# Patient Record
Sex: FEMALE | Race: WHITE | NOT HISPANIC OR LATINO | Employment: OTHER | URBAN - NONMETROPOLITAN AREA
[De-identification: names, ages, dates, MRNs, and addresses within clinical notes are randomized per-mention and may not be internally consistent; named-entity substitution may affect disease eponyms.]

---

## 2022-08-12 PROCEDURE — 99285 EMERGENCY DEPT VISIT HI MDM: CPT

## 2022-08-13 ENCOUNTER — APPOINTMENT (EMERGENCY)
Dept: CT IMAGING | Facility: HOSPITAL | Age: 67
End: 2022-08-13
Payer: COMMERCIAL

## 2022-08-13 ENCOUNTER — HOSPITAL ENCOUNTER (OUTPATIENT)
Facility: HOSPITAL | Age: 67
Setting detail: OBSERVATION
Discharge: NURSING, REHAB OR LONG-TERM CARE | End: 2022-08-13
Attending: EMERGENCY MEDICINE | Admitting: FAMILY MEDICINE
Payer: COMMERCIAL

## 2022-08-13 VITALS
WEIGHT: 293 LBS | TEMPERATURE: 98 F | SYSTOLIC BLOOD PRESSURE: 115 MMHG | RESPIRATION RATE: 16 BRPM | HEIGHT: 66 IN | BODY MASS INDEX: 47.09 KG/M2 | DIASTOLIC BLOOD PRESSURE: 55 MMHG | OXYGEN SATURATION: 90 % | HEART RATE: 77 BPM

## 2022-08-13 DIAGNOSIS — N28.89 RENAL MASS: ICD-10-CM

## 2022-08-13 DIAGNOSIS — R25.1 TREMOR: ICD-10-CM

## 2022-08-13 DIAGNOSIS — N18.9 CHRONIC KIDNEY DISEASE: ICD-10-CM

## 2022-08-13 DIAGNOSIS — N17.9 ACUTE KIDNEY INJURY (HCC): ICD-10-CM

## 2022-08-13 DIAGNOSIS — E87.20 LACTIC ACIDOSIS: ICD-10-CM

## 2022-08-13 DIAGNOSIS — E87.6 HYPOKALEMIA: Primary | ICD-10-CM

## 2022-08-13 LAB
ALBUMIN SERPL BCP-MCNC: 3.3 G/DL (ref 3.5–5)
ALP SERPL-CCNC: 68 U/L (ref 46–116)
ALT SERPL W P-5'-P-CCNC: 38 U/L (ref 12–78)
ANION GAP SERPL CALCULATED.3IONS-SCNC: 5 MMOL/L (ref 4–13)
ANION GAP SERPL CALCULATED.3IONS-SCNC: 5 MMOL/L (ref 4–13)
ANION GAP SERPL CALCULATED.3IONS-SCNC: 7 MMOL/L (ref 4–13)
APTT PPP: 30 SECONDS (ref 23–37)
AST SERPL W P-5'-P-CCNC: 31 U/L (ref 5–45)
BASOPHILS # BLD AUTO: 0.02 THOUSANDS/ΜL (ref 0–0.1)
BASOPHILS NFR BLD AUTO: 0 % (ref 0–1)
BILIRUB SERPL-MCNC: 0.91 MG/DL (ref 0.2–1)
BILIRUB UR QL STRIP: NEGATIVE
BUN SERPL-MCNC: 68 MG/DL (ref 5–25)
BUN SERPL-MCNC: 73 MG/DL (ref 5–25)
BUN SERPL-MCNC: 75 MG/DL (ref 5–25)
CALCIUM ALBUM COR SERPL-MCNC: 10.7 MG/DL (ref 8.3–10.1)
CALCIUM SERPL-MCNC: 10.1 MG/DL (ref 8.3–10.1)
CALCIUM SERPL-MCNC: 9.5 MG/DL (ref 8.3–10.1)
CALCIUM SERPL-MCNC: 9.5 MG/DL (ref 8.3–10.1)
CHLORIDE SERPL-SCNC: 100 MMOL/L (ref 96–108)
CHLORIDE SERPL-SCNC: 96 MMOL/L (ref 96–108)
CHLORIDE SERPL-SCNC: 99 MMOL/L (ref 96–108)
CK MB SERPL-MCNC: 1 % (ref 0–2.5)
CK MB SERPL-MCNC: 1.6 NG/ML (ref 0–5)
CK SERPL-CCNC: 162 U/L (ref 26–192)
CLARITY UR: CLEAR
CO2 SERPL-SCNC: 35 MMOL/L (ref 21–32)
CO2 SERPL-SCNC: 38 MMOL/L (ref 21–32)
CO2 SERPL-SCNC: 38 MMOL/L (ref 21–32)
COLOR UR: YELLOW
CREAT SERPL-MCNC: 2.37 MG/DL (ref 0.6–1.3)
CREAT SERPL-MCNC: 2.85 MG/DL (ref 0.6–1.3)
CREAT SERPL-MCNC: 3.02 MG/DL (ref 0.6–1.3)
EOSINOPHIL # BLD AUTO: 0.27 THOUSAND/ΜL (ref 0–0.61)
EOSINOPHIL NFR BLD AUTO: 5 % (ref 0–6)
ERYTHROCYTE [DISTWIDTH] IN BLOOD BY AUTOMATED COUNT: 21.4 % (ref 11.6–15.1)
FLUAV RNA RESP QL NAA+PROBE: NEGATIVE
FLUBV RNA RESP QL NAA+PROBE: NEGATIVE
GFR SERPL CREATININE-BSD FRML MDRD: 15 ML/MIN/1.73SQ M
GFR SERPL CREATININE-BSD FRML MDRD: 16 ML/MIN/1.73SQ M
GFR SERPL CREATININE-BSD FRML MDRD: 20 ML/MIN/1.73SQ M
GLUCOSE SERPL-MCNC: 122 MG/DL (ref 65–140)
GLUCOSE SERPL-MCNC: 264 MG/DL (ref 65–140)
GLUCOSE SERPL-MCNC: 99 MG/DL (ref 65–140)
GLUCOSE UR STRIP-MCNC: NEGATIVE MG/DL
HCT VFR BLD AUTO: 40.5 % (ref 34.8–46.1)
HGB BLD-MCNC: 13 G/DL (ref 11.5–15.4)
HGB UR QL STRIP.AUTO: NEGATIVE
IMM GRANULOCYTES # BLD AUTO: 0.01 THOUSAND/UL (ref 0–0.2)
IMM GRANULOCYTES NFR BLD AUTO: 0 % (ref 0–2)
INR PPP: 1.54 (ref 0.84–1.19)
KETONES UR STRIP-MCNC: NEGATIVE MG/DL
LACTATE SERPL-SCNC: 1.3 MMOL/L (ref 0.5–2)
LACTATE SERPL-SCNC: 2.2 MMOL/L (ref 0.5–2)
LEUKOCYTE ESTERASE UR QL STRIP: NEGATIVE
LIPASE SERPL-CCNC: 185 U/L (ref 73–393)
LYMPHOCYTES # BLD AUTO: 1.67 THOUSANDS/ΜL (ref 0.6–4.47)
LYMPHOCYTES NFR BLD AUTO: 29 % (ref 14–44)
MAGNESIUM SERPL-MCNC: 1.9 MG/DL (ref 1.6–2.6)
MCH RBC QN AUTO: 27.5 PG (ref 26.8–34.3)
MCHC RBC AUTO-ENTMCNC: 32.1 G/DL (ref 31.4–37.4)
MCV RBC AUTO: 86 FL (ref 82–98)
MONOCYTES # BLD AUTO: 0.53 THOUSAND/ΜL (ref 0.17–1.22)
MONOCYTES NFR BLD AUTO: 9 % (ref 4–12)
NEUTROPHILS # BLD AUTO: 3.18 THOUSANDS/ΜL (ref 1.85–7.62)
NEUTS SEG NFR BLD AUTO: 57 % (ref 43–75)
NITRITE UR QL STRIP: NEGATIVE
NRBC BLD AUTO-RTO: 0 /100 WBCS
PH UR STRIP.AUTO: 7.5 [PH]
PLATELET # BLD AUTO: 218 THOUSANDS/UL (ref 149–390)
PMV BLD AUTO: 9.8 FL (ref 8.9–12.7)
POTASSIUM SERPL-SCNC: 2.5 MMOL/L (ref 3.5–5.3)
POTASSIUM SERPL-SCNC: 2.8 MMOL/L (ref 3.5–5.3)
POTASSIUM SERPL-SCNC: 4.3 MMOL/L (ref 3.5–5.3)
PROT SERPL-MCNC: 7.3 G/DL (ref 6.4–8.4)
PROT UR STRIP-MCNC: NEGATIVE MG/DL
PROTHROMBIN TIME: 18.6 SECONDS (ref 11.6–14.5)
RBC # BLD AUTO: 4.72 MILLION/UL (ref 3.81–5.12)
RSV RNA RESP QL NAA+PROBE: NEGATIVE
SARS-COV-2 RNA RESP QL NAA+PROBE: NEGATIVE
SODIUM SERPL-SCNC: 139 MMOL/L (ref 135–147)
SODIUM SERPL-SCNC: 141 MMOL/L (ref 135–147)
SODIUM SERPL-SCNC: 143 MMOL/L (ref 135–147)
SP GR UR STRIP.AUTO: 1.01 (ref 1–1.03)
UROBILINOGEN UR QL STRIP.AUTO: 0.2 E.U./DL
WBC # BLD AUTO: 5.68 THOUSAND/UL (ref 4.31–10.16)

## 2022-08-13 PROCEDURE — 93005 ELECTROCARDIOGRAM TRACING: CPT

## 2022-08-13 PROCEDURE — 99236 HOSP IP/OBS SAME DATE HI 85: CPT | Performed by: INTERNAL MEDICINE

## 2022-08-13 PROCEDURE — 80048 BASIC METABOLIC PNL TOTAL CA: CPT

## 2022-08-13 PROCEDURE — 36415 COLL VENOUS BLD VENIPUNCTURE: CPT | Performed by: EMERGENCY MEDICINE

## 2022-08-13 PROCEDURE — 85730 THROMBOPLASTIN TIME PARTIAL: CPT | Performed by: EMERGENCY MEDICINE

## 2022-08-13 PROCEDURE — 74176 CT ABD & PELVIS W/O CONTRAST: CPT

## 2022-08-13 PROCEDURE — 82550 ASSAY OF CK (CPK): CPT | Performed by: EMERGENCY MEDICINE

## 2022-08-13 PROCEDURE — 83735 ASSAY OF MAGNESIUM: CPT | Performed by: EMERGENCY MEDICINE

## 2022-08-13 PROCEDURE — 96361 HYDRATE IV INFUSION ADD-ON: CPT

## 2022-08-13 PROCEDURE — 99285 EMERGENCY DEPT VISIT HI MDM: CPT | Performed by: EMERGENCY MEDICINE

## 2022-08-13 PROCEDURE — 80053 COMPREHEN METABOLIC PANEL: CPT | Performed by: EMERGENCY MEDICINE

## 2022-08-13 PROCEDURE — 70450 CT HEAD/BRAIN W/O DYE: CPT

## 2022-08-13 PROCEDURE — 80048 BASIC METABOLIC PNL TOTAL CA: CPT | Performed by: EMERGENCY MEDICINE

## 2022-08-13 PROCEDURE — 0241U HB NFCT DS VIR RESP RNA 4 TRGT: CPT | Performed by: EMERGENCY MEDICINE

## 2022-08-13 PROCEDURE — 82553 CREATINE MB FRACTION: CPT | Performed by: EMERGENCY MEDICINE

## 2022-08-13 PROCEDURE — 96360 HYDRATION IV INFUSION INIT: CPT

## 2022-08-13 PROCEDURE — 85610 PROTHROMBIN TIME: CPT | Performed by: EMERGENCY MEDICINE

## 2022-08-13 PROCEDURE — 85025 COMPLETE CBC W/AUTO DIFF WBC: CPT | Performed by: EMERGENCY MEDICINE

## 2022-08-13 PROCEDURE — 83690 ASSAY OF LIPASE: CPT | Performed by: EMERGENCY MEDICINE

## 2022-08-13 PROCEDURE — 81003 URINALYSIS AUTO W/O SCOPE: CPT | Performed by: EMERGENCY MEDICINE

## 2022-08-13 PROCEDURE — 83605 ASSAY OF LACTIC ACID: CPT | Performed by: EMERGENCY MEDICINE

## 2022-08-13 RX ORDER — OXYCODONE HYDROCHLORIDE AND ACETAMINOPHEN 5; 325 MG/1; MG/1
2 TABLET ORAL EVERY 4 HOURS PRN
Status: DISCONTINUED | OUTPATIENT
Start: 2022-08-13 | End: 2022-08-13 | Stop reason: HOSPADM

## 2022-08-13 RX ORDER — POTASSIUM CHLORIDE 20 MEQ/1
40 TABLET, EXTENDED RELEASE ORAL ONCE
Status: COMPLETED | OUTPATIENT
Start: 2022-08-13 | End: 2022-08-13

## 2022-08-13 RX ORDER — AMIODARONE HYDROCHLORIDE 200 MG/1
200 TABLET ORAL DAILY
Status: DISCONTINUED | OUTPATIENT
Start: 2022-08-13 | End: 2022-08-13 | Stop reason: HOSPADM

## 2022-08-13 RX ORDER — INSULIN HUMAN 500 [IU]/ML
INJECTION, SOLUTION SUBCUTANEOUS
COMMUNITY

## 2022-08-13 RX ORDER — POTASSIUM CHLORIDE 20 MEQ/1
20 TABLET, EXTENDED RELEASE ORAL DAILY
COMMUNITY

## 2022-08-13 RX ORDER — ONDANSETRON 2 MG/ML
4 INJECTION INTRAMUSCULAR; INTRAVENOUS EVERY 6 HOURS PRN
Status: DISCONTINUED | OUTPATIENT
Start: 2022-08-13 | End: 2022-08-13 | Stop reason: HOSPADM

## 2022-08-13 RX ORDER — GABAPENTIN 300 MG/1
600 CAPSULE ORAL EVERY 8 HOURS SCHEDULED
Status: DISCONTINUED | OUTPATIENT
Start: 2022-08-13 | End: 2022-08-13 | Stop reason: HOSPADM

## 2022-08-13 RX ORDER — POTASSIUM CHLORIDE 14.9 MG/ML
20 INJECTION INTRAVENOUS
Status: COMPLETED | OUTPATIENT
Start: 2022-08-13 | End: 2022-08-13

## 2022-08-13 RX ORDER — ROPINIROLE 1 MG/1
5 TABLET, FILM COATED ORAL
Status: DISCONTINUED | OUTPATIENT
Start: 2022-08-13 | End: 2022-08-13 | Stop reason: HOSPADM

## 2022-08-13 RX ORDER — SODIUM CHLORIDE 9 MG/ML
50 INJECTION, SOLUTION INTRAVENOUS CONTINUOUS
Status: DISCONTINUED | OUTPATIENT
Start: 2022-08-13 | End: 2022-08-13 | Stop reason: HOSPADM

## 2022-08-13 RX ORDER — ERGOCALCIFEROL (VITAMIN D2) 1250 MCG
50000 CAPSULE ORAL WEEKLY
COMMUNITY

## 2022-08-13 RX ORDER — ATORVASTATIN CALCIUM 10 MG/1
10 TABLET, FILM COATED ORAL
Status: DISCONTINUED | OUTPATIENT
Start: 2022-08-13 | End: 2022-08-13 | Stop reason: HOSPADM

## 2022-08-13 RX ORDER — GABAPENTIN 600 MG/1
600 TABLET ORAL EVERY 8 HOURS
COMMUNITY

## 2022-08-13 RX ORDER — ACETAMINOPHEN 325 MG/1
650 TABLET ORAL ONCE
Status: COMPLETED | OUTPATIENT
Start: 2022-08-13 | End: 2022-08-13

## 2022-08-13 RX ORDER — OXYCODONE AND ACETAMINOPHEN 10; 325 MG/1; MG/1
1 TABLET ORAL EVERY 4 HOURS PRN
COMMUNITY

## 2022-08-13 RX ORDER — CALCITRIOL 0.25 UG/1
0.5 CAPSULE, LIQUID FILLED ORAL DAILY
Status: DISCONTINUED | OUTPATIENT
Start: 2022-08-13 | End: 2022-08-13 | Stop reason: HOSPADM

## 2022-08-13 RX ORDER — OMEPRAZOLE 20 MG/1
20 CAPSULE, DELAYED RELEASE ORAL DAILY
COMMUNITY

## 2022-08-13 RX ORDER — ROPINIROLE 5 MG/1
5 TABLET, FILM COATED ORAL
COMMUNITY

## 2022-08-13 RX ORDER — ACETAMINOPHEN 325 MG/1
650 TABLET ORAL EVERY 6 HOURS PRN
Status: DISCONTINUED | OUTPATIENT
Start: 2022-08-13 | End: 2022-08-13 | Stop reason: HOSPADM

## 2022-08-13 RX ORDER — ATORVASTATIN CALCIUM 10 MG/1
10 TABLET, FILM COATED ORAL DAILY
COMMUNITY

## 2022-08-13 RX ORDER — BUMETANIDE 2 MG/1
2 TABLET ORAL 2 TIMES DAILY
COMMUNITY

## 2022-08-13 RX ORDER — ONDANSETRON 4 MG/1
4 TABLET, ORALLY DISINTEGRATING ORAL EVERY 6 HOURS PRN
COMMUNITY

## 2022-08-13 RX ORDER — DULOXETIN HYDROCHLORIDE 30 MG/1
60 CAPSULE, DELAYED RELEASE ORAL 2 TIMES DAILY
Status: DISCONTINUED | OUTPATIENT
Start: 2022-08-13 | End: 2022-08-13 | Stop reason: HOSPADM

## 2022-08-13 RX ORDER — POTASSIUM CHLORIDE 14.9 MG/ML
20 INJECTION INTRAVENOUS ONCE
Status: COMPLETED | OUTPATIENT
Start: 2022-08-13 | End: 2022-08-13

## 2022-08-13 RX ORDER — POTASSIUM CHLORIDE 29.8 MG/ML
40 INJECTION INTRAVENOUS ONCE
Status: DISCONTINUED | OUTPATIENT
Start: 2022-08-13 | End: 2022-08-13

## 2022-08-13 RX ORDER — PANTOPRAZOLE SODIUM 20 MG/1
20 TABLET, DELAYED RELEASE ORAL
Status: DISCONTINUED | OUTPATIENT
Start: 2022-08-13 | End: 2022-08-13 | Stop reason: HOSPADM

## 2022-08-13 RX ORDER — AMIODARONE HYDROCHLORIDE 200 MG/1
200 TABLET ORAL DAILY
COMMUNITY

## 2022-08-13 RX ORDER — VERAPAMIL HYDROCHLORIDE 240 MG/1
240 CAPSULE, EXTENDED RELEASE ORAL DAILY
COMMUNITY

## 2022-08-13 RX ORDER — SODIUM CHLORIDE 9 MG/ML
100 INJECTION, SOLUTION INTRAVENOUS CONTINUOUS
Status: DISCONTINUED | OUTPATIENT
Start: 2022-08-13 | End: 2022-08-13

## 2022-08-13 RX ORDER — CALCITRIOL 0.5 UG/1
0.5 CAPSULE, LIQUID FILLED ORAL DAILY
COMMUNITY

## 2022-08-13 RX ORDER — DULOXETIN HYDROCHLORIDE 60 MG/1
60 CAPSULE, DELAYED RELEASE ORAL 2 TIMES DAILY
COMMUNITY

## 2022-08-13 RX ORDER — POTASSIUM CHLORIDE 20 MEQ/1
20 TABLET, EXTENDED RELEASE ORAL DAILY
Status: DISCONTINUED | OUTPATIENT
Start: 2022-08-14 | End: 2022-08-13 | Stop reason: HOSPADM

## 2022-08-13 RX ADMIN — PANTOPRAZOLE SODIUM 20 MG: 20 TABLET, DELAYED RELEASE ORAL at 06:21

## 2022-08-13 RX ADMIN — ACETAMINOPHEN 650 MG: 325 TABLET ORAL at 00:41

## 2022-08-13 RX ADMIN — SODIUM CHLORIDE 1000 ML: 0.9 INJECTION, SOLUTION INTRAVENOUS at 00:44

## 2022-08-13 RX ADMIN — POTASSIUM CHLORIDE 40 MEQ: 1500 TABLET, EXTENDED RELEASE ORAL at 02:10

## 2022-08-13 RX ADMIN — POTASSIUM CHLORIDE 40 MEQ: 1500 TABLET, EXTENDED RELEASE ORAL at 06:11

## 2022-08-13 RX ADMIN — AMIODARONE HYDROCHLORIDE 200 MG: 200 TABLET ORAL at 08:34

## 2022-08-13 RX ADMIN — DULOXETINE HYDROCHLORIDE 60 MG: 30 CAPSULE, DELAYED RELEASE ORAL at 08:34

## 2022-08-13 RX ADMIN — POTASSIUM CHLORIDE 20 MEQ: 14.9 INJECTION, SOLUTION INTRAVENOUS at 03:54

## 2022-08-13 RX ADMIN — GABAPENTIN 600 MG: 300 CAPSULE ORAL at 06:11

## 2022-08-13 RX ADMIN — SODIUM CHLORIDE 50 ML/HR: 0.9 INJECTION, SOLUTION INTRAVENOUS at 05:33

## 2022-08-13 RX ADMIN — GABAPENTIN 600 MG: 300 CAPSULE ORAL at 13:29

## 2022-08-13 RX ADMIN — SODIUM CHLORIDE 100 ML/HR: 0.9 INJECTION, SOLUTION INTRAVENOUS at 02:09

## 2022-08-13 RX ADMIN — POTASSIUM CHLORIDE 20 MEQ: 14.9 INJECTION, SOLUTION INTRAVENOUS at 06:01

## 2022-08-13 RX ADMIN — CALCITRIOL 0.5 MCG: 0.25 CAPSULE, LIQUID FILLED ORAL at 08:48

## 2022-08-13 RX ADMIN — VERAPAMIL HYDROCHLORIDE 240 MG: 120 TABLET, FILM COATED, EXTENDED RELEASE ORAL at 08:48

## 2022-08-13 NOTE — ASSESSMENT & PLAN NOTE
- Presents with generalized weakness, tremors, seizure like episode of "starring off"  Found to have potassium 2 5  Hx renal carcinoma with pulmonary nodules     - replaced while inpatient and corrected to 4 3  - repeat BMP in 3 d at [facility]

## 2022-08-13 NOTE — CASE MANAGEMENT
Case Management Discharge Planning Note    Patient name Haylee Gonzalez  Location ED 10/ED 10 MRN [id number]  : 1955 Date 2022       Current Admission Date: 2022  Current Admission Diagnosis:Hypokalemia   Patient Active Problem List    Diagnosis Date Noted    CKD (chronic kidney disease) stage 4, GFR 15-29 ml/min (HCC)     Hypokalemia     Seizure-like activity (HCC)     Type 2 diabetes mellitus ([facility] )     Atrial fibrillation (HCC)     Renal cell carcinoma ([facility] )       LOS (days): 0  Geometric Mean LOS (GMLOS) (days):   Days to GMLOS:     OBJECTIVE:            Current admission status: Observation   Preferred Pharmacy: No Pharmacies Listed  Primary Care Provider: No primary care provider on file  Primary Insurance: [facility]  Secondary Insurance: [place] [employer]    DISCHARGE DETAILS:      Notified by provider that patient is cleared to return to [facility]  Patient is currently in the ED, holding for a bed on [facility]  Patient has been at [facility] since 2022  CM called [facility]- unable to reach nursing supervisor, however left a message that patient will be returning today  [facility]  [facility] referral placed for transport back to [facility]  Medical necessity was done and provided to the ED Nurse  Nursing is aware of contact numbers for [facility] and Fax number  Nursing will follow up with [facility] for transport time  Notified Aydin at [facility] of patients BMI/ Weight of 372  Await confirmation of transport time  4 Pm- Received notification that [facility] will be picking patient up at [phone], informed nursing in the ED and spoke to Nursing Supervisor Ankush Landis at [facility]                                                                                                   Accepting Facility Name, [address] : [facility]  Receiving Facility/Agency Phone Number: [phone]  Facility/Agency Fax Number: [phone]

## 2022-08-13 NOTE — DISCHARGE SUMMARY
[address] M[address] Jomar  Discharge- [place] Number 1955, 79 y o  female MRN: [id number]  Unit/Bed#: ED 10 Encounter: [id number]  Primary Care Provider: No primary care provider on file  Date and time admitted to hospital: 8/13/2022 12:12 AM    * Hypokalemia  Assessment & Plan  - Presents with generalized weakness, tremors, seizure like episode of "starring off"  Found to have potassium 2 5  Hx renal carcinoma with pulmonary nodules  - replaced while inpatient and corrected to 4 3  - repeat BMP in 3 d at [facility]    Seizure-like activity [facility])  Assessment & Plan  - Episode of "starring off" while in the ED  - no previous history of seizures  - no convulsions or loss of consciousness  - no biting of the tongue  - no loss of bowel or bladder function    - CT head negative  - no evidence to suggest seizure   - Of note, had PET scan in May 2022 which did not show evidence of brain mets       Renal cell carcinoma ([facility] )  Assessment & Plan  - Follows with [facility]  - Current left kidney renal carcinoma , receiving Opdivo and cabozantinib  - CT a/p on admission demonstrating known left kidney malignancy and possible duodenal diverticulum vs pathologic lymph node?   - PET scan in May 2022 shows possible lung nodules, left kidney malignancy, and similar finding of possible diverticulum   - Continue oral Cabozantinib daily   - Continue outpatient follow-up with non emergent MRI abdomen or CT a/p with oral contrast     Atrial fibrillation ([facility] )  Assessment & Plan  - Continue Xeralto , amiodarone, varapamil     Type 2 diabetes mellitus ([facility] )  Assessment & Plan  - resume home regimen on discharge    CKD (chronic kidney disease) stage 4, GFR 15-29 ml/min ([facility])  Assessment & Plan  - renal function at baseline; monitor as an outpatient      Discharging Physician / Practitioner: Anisha Melo DO  PCP: No primary care provider on file    Admission Date:   Admission Orders (From admission, onward) Ordered        08/13/22 0352  Place in Observation  Once                      Discharge Date: 08/13/22    Consultations During Hospital Stay:  · none    Procedures Performed:   · none    Significant Findings / Test Results:   · hypokalemia    Incidental Findings:   · none     Test Results Pending at Discharge (will require follow up):   · none     Outpatient Tests Requested:  · Repeat BMP in 3 days    Complications:  none    Reason for Admission: hypokalemia    Hospital Course:   Shani Valdez is a 79 y o  female patient who originally presented to the hospital on 8/13/2022 due to tremors and generalized weakness  In the emergency room she was found to be hypokalemic but workup was otherwise unremarkable  Nothing in the history suggested seizure activity such as loss of bowel or bladder function, biting of the tongue, loss of consciousness, convulsions  Her potassium was replaced with IV infusion and corrected to 4 3  She had no further symptoms since hospitalized  She will be discharged back to SNF in good condition  Repeat BMP should be performed in 3 days  Otherwise patient was asymptomatic at the time of discharge and eager to transition back home  Please see above list of diagnoses and related plan for additional information  Condition at Discharge: good    Discharge Day Visit / Exam:   Subjective:  Patient seen examined on day of discharge  Feeling well  No further episodes  Christine Dean to return home  Vitals: Blood Pressure: 115/55 (08/13/22 1330)  Pulse: 77 (08/13/22 1330)  Temperature: 98 °F (36 7 °C) (08/13/22 1021)  Temp Source: Temporal (08/13/22 0739)  Respirations: 16 (08/13/22 1330)  Height: 5' 6" (167 6 cm) (08/13/22 0550)  Weight - Scale: (!) 169 kg (372 lb) (08/13/22 0835)  SpO2: 90 % (08/13/22 1330)    PHYSICAL EXAM:    Vitals signs reviewed  Constitutional   Awake and cooperative  NAD  Head/Neck   Normocephalic  Atraumatic  HEENT   No scleral icterus  EOMI     Heart   Regular rate and rhythm  No murmers  Lungs   Clear to auscultation bilaterally  Respirations unlaboured  Abdomen   Soft  Nontender  Nondistended  Skin   Skin color normal  No rashes  Extremities   No deformities  No peripheral edema  Neuro   Alert and oriented  No new deficits  Psych   Mood stable  Affect normal          Discussion with Family: Patient declined call to   Discharge instructions/Information to patient and family:   See after visit summary for information provided to patient and family  Provisions for Follow-Up Care:  See after visit summary for information related to follow-up care and any pertinent home health orders  Disposition:   [facility] Readmission: NO     Discharge Statement:  I spent 35 minutes discharging the patient  This time was spent on the day of discharge  I had direct contact with the patient on the day of discharge  Greater than 50% of the total time was spent examining patient, answering all patient questions, arranging and discussing plan of care with patient as well as directly providing post-discharge instructions  Additional time then spent on discharge activities  Discharge Medications:  See after visit summary for reconciled discharge medications provided to patient and/or family        **Please Note: This note may have been constructed using a voice recognition system**

## 2022-08-13 NOTE — ASSESSMENT & PLAN NOTE
· Follows with [facility]  · Current left kidney renal carcinoma , receiving Opdivo and cabozantinib  · CT a/p on admission demonstrating known left kidney malignancy and possible duodenal diverticulum vs pathologic lymph node?    · PET scan in May 2022 shows possible lung nodules, left kidney malignancy, and similar finding of possible diverticulum   · Continue oral Cabozantinib daily   · Continue outpatient follow-up with non emergent MRI abdomen or CT a/p with oral contrast

## 2022-08-13 NOTE — ED NOTES
Patient has a sore on her right and left posterior upper thighs, both have clear dressings on with dried blood visible  Patient wanted to leave them on at this time, if they are changed stated she would have to lay on her stomach and does not want to at this time        Eri Ríos RN  08/13/22 2136

## 2022-08-13 NOTE — ASSESSMENT & PLAN NOTE
· Episode of "starring off" while in the ED, no urinary incontinence or loss of consciousness reported  No prior history of seizures     · Low suspicion for seizure activity  · CT head negative for acute process  · Of note, had PET scan in May 2022 which did not show evidence of brain mets   · Check UA

## 2022-08-13 NOTE — ASSESSMENT & PLAN NOTE
Lab Results   Component Value Date    HGBA1C 8 6 (H) 06/02/2022       No results for input(s): POCGLU in the last 72 hours  Blood Sugar Average: Last 72 hrs:  · Hold Victoza   Takes Humulin U500 on sliding scale per nursing home records  · Start SSI   · Hypoglycemia protocol

## 2022-08-13 NOTE — ED NOTES
Patient ambulated to the bathroom 1 assist, gait steady  Placed in an inpatient bed        Norma Agarwal RN  08/13/22 8504

## 2022-08-13 NOTE — ED PROVIDER NOTES
History  Chief Complaint   Patient presents with    Seizure - New Onset     Pt reports nursing staff calling 911 due to seizure activity, during triage patient stopped answering questions and stared at wall, pt maintained airway and was not incontinent, pt also responded to painful stimuli, pt woke a+ox4     Patient is a 61-year-old female with history of AFib CHF chronic kidney disease COPD diabetes and fibromyalgia and hypertension presents the emergency department due to having the shakes all day today and generalized weakness and fatigue and not feeling well  Nursing staff was concerned for possible seizure-like activity as patient was shaking but reportedly remained alert oriented during episodes and patient reports she recalls the shaking episodes  No fevers patient does report some abdominal pain and discomfort  History provided by:  Patient and EMS personnel      None       Past Medical History:   Diagnosis Date    Atrial fibrillation ([facility] )     CHF (congestive heart failure) ([facility] )     Chronic kidney disease (CKD)     COPD (chronic obstructive pulmonary disease) ([facility] )     Diabetes mellitus ([facility] )     Fibromyalgia     Hypertension        History reviewed  No pertinent surgical history  History reviewed  No pertinent family history  I have reviewed and agree with the history as documented  E-Cigarette/Vaping    E-Cigarette Use Never User      E-Cigarette/Vaping Substances    Nicotine No     THC No     CBD No     Flavoring No     Other No     Unknown No      Social History     Tobacco Use    Smoking status: Never Smoker    Smokeless tobacco: Never Used   Vaping Use    Vaping Use: Never used   Substance Use Topics    Alcohol use: Not Currently    Drug use: Not Currently       Review of Systems   Constitutional: Positive for activity change, appetite change, chills and fatigue  Negative for fever  HENT: Negative for congestion, ear pain, rhinorrhea and sore throat      Eyes: Negative for discharge, redness and visual disturbance  Respiratory: Negative for cough, chest tightness, shortness of breath and wheezing  Cardiovascular: Negative for chest pain and palpitations  Gastrointestinal: Positive for abdominal pain  Negative for constipation, diarrhea, nausea and vomiting  Endocrine: Negative for polydipsia and polyuria  Genitourinary: Negative for difficulty urinating, dysuria, frequency, hematuria and urgency  Musculoskeletal: Negative for arthralgias and myalgias  Skin: Negative for color change, pallor and rash  Neurological: Positive for tremors and weakness  Negative for dizziness, light-headedness, numbness and headaches  Hematological: Negative for adenopathy  Does not bruise/bleed easily  All other systems reviewed and are negative  Physical Exam  Physical Exam  Vitals and nursing note reviewed  Constitutional:       Appearance: She is well-developed  HENT:      Head: Normocephalic and atraumatic  Right Ear: External ear normal       Left Ear: External ear normal       Nose: Nose normal    Eyes:      Conjunctiva/sclera: Conjunctivae normal       Pupils: Pupils are equal, round, and reactive to light  Cardiovascular:      Rate and Rhythm: Normal rate and regular rhythm  Heart sounds: Normal heart sounds  Pulmonary:      Effort: Pulmonary effort is normal  No respiratory distress  Breath sounds: Normal breath sounds  No wheezing or rales  Chest:      Chest wall: No tenderness  Abdominal:      General: Bowel sounds are normal  There is no distension  Palpations: Abdomen is soft  Tenderness: There is generalized abdominal tenderness  There is no guarding or rebound  Musculoskeletal:         General: Normal range of motion  Cervical back: Normal range of motion and neck supple  Skin:     General: Skin is warm and dry  Neurological:      Mental Status: She is alert and oriented to person, place, and time  Cranial Nerves: No cranial nerve deficit  Sensory: No sensory deficit           Vital Signs  ED Triage Vitals   Temperature Pulse Respirations Blood Pressure SpO2   08/13/22 0024 08/13/22 0024 08/13/22 0024 08/13/22 0024 08/13/22 0024   98 2 °F (36 8 °C) 72 20 122/68 94 %      Temp Source Heart Rate Source Patient Position - Orthostatic VS BP Location FiO2 (%)   08/13/22 0024 08/13/22 0024 08/13/22 0145 08/13/22 0145 --   Temporal Monitor Sitting Right arm       Pain Score       08/13/22 0041       6           Vitals:    08/13/22 0215 08/13/22 0245 08/13/22 0315 08/13/22 0330   BP: 135/76 138/69 141/82 131/68   Pulse: 66 67 67 66   Patient Position - Orthostatic VS: Sitting Sitting Sitting Sitting         Visual Acuity      ED Medications  Medications   sodium chloride 0 9 % infusion (100 mL/hr Intravenous New Bag 8/13/22 0209)   potassium chloride 20 mEq IVPB (premix) (has no administration in time range)   sodium chloride 0 9 % bolus 1,000 mL (0 mL Intravenous Stopped 8/13/22 0245)   acetaminophen (TYLENOL) tablet 650 mg (650 mg Oral Given 8/13/22 0041)   potassium chloride (K-DUR,KLOR-CON) CR tablet 40 mEq (40 mEq Oral Given 8/13/22 0210)       Diagnostic Studies  Results Reviewed     Procedure Component Value Units Date/Time    Basic metabolic panel [[id number]]  (Abnormal) Collected: 08/13/22 0244    Lab Status: Final result Specimen: Blood from Arm, Left Updated: 08/13/22 0320     Sodium 143 mmol/L      Potassium 2 5 mmol/L      Chloride 100 mmol/L      CO2 38 mmol/L      ANION GAP 5 mmol/L      BUN 73 mg/dL      Creatinine 2 85 mg/dL      Glucose 99 mg/dL      Calcium 9 5 mg/dL      eGFR 16 ml/min/1 73sq m     Narrative:      [place] guidelines for Chronic Kidney Disease (CKD):     Stage 1 with normal or high GFR (GFR > 90 mL/min/1 73 square meters)    Stage 2 Mild CKD (GFR = 60-89 mL/min/1 73 square meters)    Stage 3A Moderate CKD (GFR = 45-59 mL/min/1 73 square meters)   Stage 3B Moderate CKD (GFR = 30-44 mL/min/1 73 square meters)    Stage 4 Severe CKD (GFR = 15-29 mL/min/1 73 square meters)    Stage 5 End Stage CKD (GFR <15 mL/min/1 73 square meters)  Note: GFR calculation is accurate only with a steady state creatinine    Lactic acid 2 Hours [[id number]]  (Normal) Collected: 08/13/22 0244    Lab Status: Final result Specimen: Blood from Arm, Left Updated: 08/13/22 0319     LACTIC ACID 1 3 mmol/L     Narrative:      Result may be elevated if tourniquet was used during collection  CKMB [[id number]]  (Normal) Collected: 08/13/22 0037    Lab Status: Final result Specimen: Blood from Arm, Left Updated: 08/13/22 0249     CK-MB Index 1 0 %      CK-MB 1 6 ng/mL     Lactic acid [[id number]]  (Abnormal) Collected: 08/13/22 0037    Lab Status: Final result Specimen: Blood from Arm, Left Updated: 08/13/22 0138     LACTIC ACID 2 2 mmol/L     Narrative:      Result may be elevated if tourniquet was used during collection  FLU/RSV/COVID - if FLU/RSV clinically relevant [[id number]]  (Normal) Collected: 08/13/22 0037    Lab Status: Final result Specimen: Nares from Nose Updated: 08/13/22 0138     SARS-CoV-2 Negative     INFLUENZA A PCR Negative     INFLUENZA B PCR Negative     RSV PCR Negative    Narrative:      FOR PEDIATRIC PATIENTS - copy/paste COVID Guidelines URL to browser: [email or web address]    SARS-CoV-2 assay is a Nucleic Acid Amplification assay intended for the  qualitative detection of nucleic acid from SARS-CoV-2 in nasopharyngeal  swabs  Results are for the presumptive identification of SARS-CoV-2 RNA  Positive results are indicative of infection with SARS-CoV-2, the virus  causing COVID-19, but do not rule out bacterial infection or co-infection  with other viruses  Laboratories within the [place] and its  territories are required to report all positive results to the appropriate  public health authorities  Negative results do not preclude SARS-CoV-2  infection and should not be used as the sole basis for treatment or other  patient management decisions  Negative results must be combined with  clinical observations, patient history, and epidemiological information  This test has not been FDA cleared or approved  This test has been authorized by FDA under an Emergency Use Authorization  (EUA)  This test is only authorized for the duration of time the  declaration that circumstances exist justifying the authorization of the  emergency use of an in vitro diagnostic tests for detection of SARS-CoV-2  virus and/or diagnosis of COVID-19 infection under section 564(b)(1) of  the Act, 21 U  S C  686FXQ-2(G)(6), unless the authorization is terminated  or revoked sooner  The test has been validated but independent review by FDA  and CLIA is pending  Test performed using Note GeneXpert: This RT-PCR assay targets N2,  a region unique to SARS-CoV-2  A conserved region in the E-gene was chosen  for pan-Sarbecovirus detection which includes SARS-CoV-2      Protime-INR [[id number]]  (Abnormal) Collected: 08/13/22 0037    Lab Status: Final result Specimen: Blood from Arm, Left Updated: 08/13/22 0137     Protime 18 6 seconds      INR 1 54    APTT [[id number]]  (Normal) Collected: 08/13/22 0037    Lab Status: Final result Specimen: Blood from Arm, Left Updated: 08/13/22 0137     PTT 30 seconds     Lipase [[id number]]  (Normal) Collected: 08/13/22 0037    Lab Status: Final result Specimen: Blood from Arm, Left Updated: 08/13/22 0123     Lipase 185 u/L     Comprehensive metabolic panel [[id number]]  (Abnormal) Collected: 08/13/22 0037    Lab Status: Final result Specimen: Blood from Arm, Left Updated: 08/13/22 0123     Sodium 141 mmol/L      Potassium 2 8 mmol/L      Chloride 96 mmol/L      CO2 38 mmol/L      ANION GAP 7 mmol/L      BUN 75 mg/dL      Creatinine 3 02 mg/dL      Glucose 122 mg/dL      Calcium 10 1 mg/dL      Corrected Calcium 10 7 mg/dL      AST 31 U/L      ALT 38 U/L      Alkaline Phosphatase 68 U/L      Total Protein 7 3 g/dL      Albumin 3 3 g/dL      Total Bilirubin 0 91 mg/dL      eGFR 15 ml/min/1 73sq m     Narrative:      National Kidney Disease Foundation guidelines for Chronic Kidney Disease (CKD):     Stage 1 with normal or high GFR (GFR > 90 mL/min/1 73 square meters)    Stage 2 Mild CKD (GFR = 60-89 mL/min/1 73 square meters)    Stage 3A Moderate CKD (GFR = 45-59 mL/min/1 73 square meters)    Stage 3B Moderate CKD (GFR = 30-44 mL/min/1 73 square meters)    Stage 4 Severe CKD (GFR = 15-29 mL/min/1 73 square meters)    Stage 5 End Stage CKD (GFR <15 mL/min/1 73 square meters)  Note: GFR calculation is accurate only with a steady state creatinine    Magnesium [[id number]]  (Normal) Collected: 08/13/22 0037    Lab Status: Final result Specimen: Blood from Arm, Left Updated: 08/13/22 0123     Magnesium 1 9 mg/dL     CK Total with Reflex CKMB [[id number]]  (Normal) Collected: 08/13/22 0037    Lab Status: Final result Specimen: Blood from Arm, Left Updated: 08/13/22 0121     Total  U/L     CBC and differential [[id number]]  (Abnormal) Collected: 08/13/22 0037    Lab Status: Final result Specimen: Blood from Arm, Left Updated: 08/13/22 0101     WBC 5 68 Thousand/uL      RBC 4 72 Million/uL      Hemoglobin 13 0 g/dL      Hematocrit 40 5 %      MCV 86 fL      MCH 27 5 pg      MCHC 32 1 g/dL      RDW 21 4 %      MPV 9 8 fL      Platelets 393 Thousands/uL      nRBC 0 /100 WBCs      Neutrophils Relative 57 %      Immat GRANS % 0 %      Lymphocytes Relative 29 %      Monocytes Relative 9 %      Eosinophils Relative 5 %      Basophils Relative 0 %      Neutrophils Absolute 3 18 Thousands/µL      Immature Grans Absolute 0 01 Thousand/uL      Lymphocytes Absolute 1 67 Thousands/µL      Monocytes Absolute 0 53 Thousand/µL      Eosinophils Absolute 0 27 Thousand/µL      Basophils Absolute 0 02 Thousands/µL     UA w Reflex to Microscopic w Reflex to Culture [[id number]]     Lab Status: No result Specimen: Urine                  CT abdomen pelvis wo contrast   Final Result by Yaneth Cole MD (08/13 0159)      1  Large heterogeneous mass in the left kidney consistent with history of malignancy  2   Indeterminate lobulated hypodense structure abutting the descending segment of the duodenum  Considerations include pathologic lymph node or duodenal diverticulum  Recommend nonemergent CT with IV and oral contrast or alternatively, MRI of the    abdomen for further evaluation  3   Colonic diverticulosis  I personally discussed this study with Gina Roberto on 8/13/2022 at 1:56 AM                Workstation performed: [id number]         CT head without contrast   Final Result by Yaneth Cole MD (08/13 0149)      No acute intracranial abnormality  Workstation performed: [id number]                    Procedures  ECG 12 Lead Documentation Only    Date/Time: 8/13/2022 12:52 AM  Performed by: Tyrell Muñoz DO  Authorized by: Tyrell Muñoz DO     ECG reviewed by me, the ED Provider: yes    Patient location:  ED  Previous ECG:     Comparison to cardiac monitor: Yes    Quality:     Tracing quality:  Limited by artifact  Rate:     ECG rate:  73    ECG rate assessment: normal    Rhythm:     Rhythm: sinus rhythm    QRS:     QRS axis:  Right    QRS intervals:  Normal  Conduction:     Conduction: normal    ST segments:     ST segments:  Normal  T waves:     T waves: non-specific               ED Course  ED Course as of 08/13/22 0355   Sat Aug 13, 2022   0154 Creatinine(!): 3 02  Review of prior labs reveal baseline creatinine about 2 4-2 7 also with chronic hypopkalemia  8273 Following fluids the creatinine has improved somewhat and lactic acidosis has cleared however the potassium is now lower at 2 5 despite having been treated with oral potassium       [facility] [address] [facility] with hospitalist advanced practitioner on-call Madhu Corado reviewed case and findings in the emergency department management thus far she accepts for observation on behalf of Dr Valencia Mckay  SBIRT 22yo+    Flowsheet Row Most Recent Value   SBIRT (23 yo +)    In order to provide better care to our patients, we are screening all of our patients for alcohol and drug use  Would it be okay to ask you these screening questions? Yes Filed at: 08/13/2022 0040   Initial Alcohol Screen: US AUDIT-C     1  How often do you have a drink containing alcohol? 0 Filed at: 08/13/2022 0040   2  How many drinks containing alcohol do you have on a typical day you are drinking? 0 Filed at: 08/13/2022 0040   3a  Male UNDER 65: How often do you have five or more drinks on one occasion? 0 Filed at: 08/13/2022 0040   3b  FEMALE Any Age, or MALE 65+: How often do you have 4 or more drinks on one occassion? 0 Filed at: 08/13/2022 0040   Audit-C Score 0 Filed at: 08/13/2022 0040   PHILLIP: How many times in the past year have you    Used an illegal drug or used a prescription medication for non-medical reasons?  Never Filed at: 08/13/2022 0040                    MDM  Number of Diagnoses or Management Options  Acute kidney injury [facility]): new and requires workup  Chronic kidney disease: new and requires workup  Hypokalemia: new and requires workup  Lactic acidosis: new and requires workup  Renal mass: new and requires workup  Tremor: new and requires workup     Amount and/or Complexity of Data Reviewed  Clinical lab tests: ordered and reviewed  Tests in the radiology section of CPT®: ordered and reviewed  Tests in the medicine section of CPT®: reviewed and ordered  Decide to obtain previous medical records or to obtain history from someone other than the patient: yes  Review and summarize past medical records: yes  Independent visualization of images, tracings, or specimens: yes    Risk of Complications, Morbidity, and/or Mortality  Presenting problems: moderate  Diagnostic procedures: moderate  Management options: moderate    Patient Progress  Patient progress: stable      Disposition  Final diagnoses:   Acute kidney injury ([facility] )   Chronic kidney disease   Hypokalemia - Acute on chronic   Tremor   Renal mass   Lactic acidosis     Time reflects when diagnosis was documented in both MDM as applicable and the Disposition within this note     Time User Action Codes Description Comment    8/13/2022  1:54 AM Shaan Lacrosse Add [N17 9] Acute kidney injury ([address] )     8/13/2022  1:54 AM Shaan Lacrosse Add [N18 9] Chronic kidney disease     8/13/2022  1:54 AM Shaan Lacrosse Add [E87 6] Hypokalemia     8/13/2022  1:54 AM Shaan Lacrosse Modify [E87 6] Hypokalemia Acute on chronic    8/13/2022  1:57 AM Phill Whitehead Add [R25 1] Tremor     8/13/2022  1:57 AM Shaan Lacrosse Add [N28 89] Renal mass     8/13/2022  1:58 AM Phill Shaffer Add [E87 2] Lactic acidosis     8/13/2022  3:50 AM Shaan Lacrosse Modify [N17 9] Acute kidney injury ([facility] )     8/13/2022  3:50 AM Shaan Lacrosse Modify [E87 6] Hypokalemia Acute on chronic      ED Disposition     ED Disposition   Admit    Condition   Stable    Date/Time   Sat Aug 13, 2022  3:50 AM    Comment   Case was discussed with Kavon levi and the patient's admission status was agreed to be Admission Status: observation status to the service of Dr Gaylia Olszewski  Follow-up Information    None         Patient's Medications    No medications on file       No discharge procedures on file      PDMP Review     None          ED Provider  Electronically Signed by           Gerardo Sanchez DO  08/13/22 1933

## 2022-08-13 NOTE — ASSESSMENT & PLAN NOTE
· Presents with generalized weakness, tremors, seizure like episode of "starring off"  Found to have potassium 2 8  Hx renal carcinoma with pulmonary nodules  · In ED received 40 K dur, repeat labs with worsening K 2 5     · Will give additional IV potassium replacement  · Monitor on telemetry for now  · Recheck BMP

## 2022-08-13 NOTE — ASSESSMENT & PLAN NOTE
- Follows with [facility]  - Current left kidney renal carcinoma , receiving Opdivo and cabozantinib    - CT a/p on admission demonstrating known left kidney malignancy and possible duodenal diverticulum vs pathologic lymph node?   - PET scan in May 2022 shows possible lung nodules, left kidney malignancy, and similar finding of possible diverticulum   - Continue oral Cabozantinib daily   - Continue outpatient follow-up with non emergent MRI abdomen or CT a/p with oral contrast

## 2022-08-13 NOTE — ASSESSMENT & PLAN NOTE
- Episode of "starring off" while in the ED  - no previous history of seizures  - no convulsions or loss of consciousness  - no biting of the tongue  - no loss of bowel or bladder function    - CT head negative  - no evidence to suggest seizure   - Of note, had PET scan in May 2022 which did not show evidence of brain mets

## 2022-08-13 NOTE — ASSESSMENT & PLAN NOTE
Lab Results   Component Value Date    EGFR 16 08/13/2022    EGFR 15 08/13/2022    CREATININE 2 85 (H) 08/13/2022    CREATININE 3 02 (H) 08/13/2022   · Recent baseline 2 4 - 2 7 , does not meet AMI parameters   · Current renal cell carcinoma   · Avoid nephrotoxins, hypotension  · Trend BMP

## 2022-08-13 NOTE — ED NOTES
Patient stated her IV was burning her arm IV fluids increased to 100ml/hr while potassium is running, provider aware and agrees        Marilyn Goodpasture, RN  08/13/22 3255

## 2022-08-13 NOTE — ED NOTES
Pt refusing to use O2 due to she"has a normal range of 96 to 90% on room air"       Roseanne Estrada, REYNA  08/13/22 7160

## 2022-08-13 NOTE — H&P
Nico Soni 1955, 79 y o  female MRN: [id number]  Unit/Bed#: ED 10 Encounter: [id number]  Primary Care Provider: No primary care provider on file  Date and time admitted to hospital: 8/13/2022 12:12 AM    * Hypokalemia  Assessment & Plan  · Presents with generalized weakness, tremors, seizure like episode of "starring off"  Found to have potassium 2 8  Hx renal carcinoma with pulmonary nodules  · In ED received 40 K dur, repeat labs with worsening K 2 5  · Will give additional IV potassium replacement  · Monitor on telemetry for now  · Recheck BMP     [facility]  Assessment & Plan  · Episode of "starring off" while in the ED, no urinary incontinence or loss of consciousness reported  No prior history of seizures  · Low suspicion for seizure activity  · CT head negative for acute process  · Of note, had PET scan in May 2022 which did not show evidence of brain mets   · Check UA     Renal cell carcinoma ([facility] )  Assessment & Plan  · Follows with [facility]  · Current left kidney renal carcinoma , receiving Opdivo and cabozantinib  · CT a/p on admission demonstrating known left kidney malignancy and possible duodenal diverticulum vs pathologic lymph node? · PET scan in May 2022 shows possible lung nodules, left kidney malignancy, and similar finding of possible diverticulum   · Continue oral Cabozantinib daily   · Continue outpatient follow-up with non emergent MRI abdomen or CT a/p with oral contrast     Atrial fibrillation (HCC)  Assessment & Plan  · Continue Xeralto , amiodarone, varapamil     Type 2 diabetes mellitus ([facility] )  Assessment & Plan  Lab Results   Component Value Date    HGBA1C 8 6 (H) 06/02/2022       No results for input(s): POCGLU in the last 72 hours  Blood Sugar Average: Last 72 hrs:  · Hold Victoza   Takes Humulin U500 on sliding scale per nursing home records  · Start SSI   · Hypoglycemia protocol     CKD (chronic kidney disease) stage 4, GFR 15-29 ml/min [facility])  Assessment & Plan  Lab Results   Component Value Date    EGFR 16 08/13/2022    EGFR 15 08/13/2022    CREATININE 2 85 (H) 08/13/2022    CREATININE 3 02 (H) 08/13/2022   · Recent baseline 2 4 - 2 7 , does not meet AMI parameters   · Current renal cell carcinoma   · Avoid nephrotoxins, hypotension  · Trend BMP    VTE Pharmacologic Prophylaxis:   Moderate Risk (Score 3-4) - Pharmacological DVT Prophylaxis Ordered: rivaroxaban (Xarelto)  Code Status: Full code   Discussion with family: Patient declined call to   Anticipated Length of Stay: Patient will be admitted on an observation basis with an anticipated length of stay of less than 2 midnights secondary to hypokalemia- k replace and repeat labs   Total Time for Visit, including Counseling / Coordination of Care: 70 minutes Greater than 50% of this total time spent on direct patient counseling and coordination of care  Chief Complaint: tremors and generalized weakness    History of Present Illness:  Madison Padilla is a 79 y o  female with a PMH of renal carcinoma, CKD 4, DM2, COPD, Afib on Xarelto  who presents [facility] with tremors and generalized weakness  Reports that she felt she was "shaking" , unable to elaborate further  Patient denies loss of consciousness or incontinence of bowel/bladder  Reports no prior history of seizures  Currently receiving cancer treatment for renal carcinoma  Denies chest pain, shortness of breath, fevers, chills, GI complaints  Review of Systems:  Review of Systems   Constitutional: Negative for activity change, chills, fatigue and fever  Respiratory: Negative for cough and shortness of breath  Cardiovascular: Negative for chest pain, palpitations and leg swelling  Gastrointestinal: Negative for abdominal pain, constipation, diarrhea, nausea and vomiting  Genitourinary: Negative for difficulty urinating     Musculoskeletal: Negative for arthralgias and back pain  Generalized weakness     Neurological: Positive for tremors  Negative for dizziness, light-headedness, numbness and headaches  All other systems reviewed and are negative  Past Medical and Surgical History:   Past Medical History:   Diagnosis Date    Atrial fibrillation ([address] )     CHF (congestive heart failure) (HCC)     Chronic kidney disease (CKD)     COPD (chronic obstructive pulmonary disease) (HCC)     Diabetes mellitus (HCC)     Fibromyalgia     Hypertension        History reviewed  No pertinent surgical history  Meds/Allergies:  Prior to Admission medications    Not on File     I have reveiwed home medications using records provided by [facility]  Allergies: Allergies   Allergen Reactions    Bee Venom Anaphylaxis       Social History:  Marital Status: /Civil Union   Patient Pre-hospital Living Situation: Home  Patient Pre-hospital Level of Mobility: unable to be assessed at time of evaluation  Patient Pre-hospital Diet Restrictions: none  Substance Use History:   Social History     Substance and Sexual Activity   Alcohol Use Not Currently     Social History     Tobacco Use   Smoking Status Never Smoker   Smokeless Tobacco Never Used     Social History     Substance and Sexual Activity   Drug Use Not Currently       Family History:  History reviewed  No pertinent family history  Physical Exam:     Vitals:   Blood Pressure: 132/78 (08/13/22 0445)  Pulse: 68 (08/13/22 0445)  Temperature: 98 2 °F (36 8 °C) (08/13/22 0024)  Temp Source: Temporal (08/13/22 0024)  Respirations: 18 (08/13/22 0445)  Height: 5' 6" (167 6 cm) (08/13/22 0550)  Weight - Scale: (!) 169 kg (373 lb 0 3 oz) (08/13/22 0024)  SpO2: 93 % (08/13/22 0445)    Physical Exam  Vitals and nursing note reviewed  Constitutional:       General: She is not in acute distress  Appearance: Normal appearance  She is not ill-appearing, toxic-appearing or diaphoretic     HENT:      Head: Normocephalic and atraumatic  Eyes:      Pupils: Pupils are equal, round, and reactive to light  Cardiovascular:      Rate and Rhythm: Normal rate and regular rhythm  Pulses: Normal pulses  Heart sounds: Normal heart sounds  Pulmonary:      Effort: Pulmonary effort is normal  No respiratory distress  Breath sounds: No wheezing, rhonchi or rales  Abdominal:      General: Bowel sounds are normal       Palpations: Abdomen is soft  Tenderness: There is no abdominal tenderness  There is no guarding  Comments: obese abdomen    Musculoskeletal:         General: Normal range of motion  Right lower leg: No edema  Left lower leg: No edema  Skin:     General: Skin is warm and dry  Neurological:      General: No focal deficit present  Mental Status: She is alert and oriented to person, place, and time  Comments: Oriented x 3          Additional Data:     Lab Results:  Results from last 7 days   Lab Units 08/13/22  0037   WBC Thousand/uL 5 68   HEMOGLOBIN g/dL 13 0   HEMATOCRIT % 40 5   PLATELETS Thousands/uL 218   NEUTROS PCT % 57   LYMPHS PCT % 29   MONOS PCT % 9   EOS PCT % 5     Results from last 7 days   Lab Units 08/13/22  0244 08/13/22  0037   SODIUM mmol/L 143 141   POTASSIUM mmol/L 2 5* 2 8*   CHLORIDE mmol/L 100 96   CO2 mmol/L 38* 38*   BUN mg/dL 73* 75*   CREATININE mg/dL 2 85* 3 02*   ANION GAP mmol/L 5 7   CALCIUM mg/dL 9 5 10 1   ALBUMIN g/dL  --  3 3*   TOTAL BILIRUBIN mg/dL  --  0 91   ALK PHOS U/L  --  68   ALT U/L  --  38   AST U/L  --  31   GLUCOSE RANDOM mg/dL 99 122     Results from last 7 days   Lab Units 08/13/22  0037   INR  1 54*             Results from last 7 days   Lab Units 08/13/22  0244 08/13/22  0037   LACTIC ACID mmol/L 1 3 2 2*       Imaging: Reviewed radiology reports from this admission including: abdominal/pelvic CT  CT abdomen pelvis wo contrast   Final Result by Autumn Hernandez MD (08/13 0159)      1    Large heterogeneous mass in the left kidney consistent with history of malignancy  2   Indeterminate lobulated hypodense structure abutting the descending segment of the duodenum  Considerations include pathologic lymph node or duodenal diverticulum  Recommend nonemergent CT with IV and oral contrast or alternatively, MRI of the    abdomen for further evaluation  3   Colonic diverticulosis  I personally discussed this study with Coco Rosa on 8/13/2022 at 1:56 AM                Workstation performed: [id number]         CT head without contrast   Final Result by Jackson Almanza MD (08/13 0149)      No acute intracranial abnormality  Workstation performed: [id number]             EKG and Other Studies Reviewed on Admission:   · EKG: NSR  HR 73     ** Please Note: This note has been constructed using a voice recognition system   **

## 2022-08-14 LAB
ATRIAL RATE: 73 BPM
P AXIS: 68 DEGREES
PR INTERVAL: 226 MS
QRS AXIS: 109 DEGREES
QRSD INTERVAL: 96 MS
QT INTERVAL: 480 MS
QTC INTERVAL: 528 MS
T WAVE AXIS: 90 DEGREES
VENTRICULAR RATE: 73 BPM